# Patient Record
Sex: FEMALE | Race: WHITE | URBAN - METROPOLITAN AREA
[De-identification: names, ages, dates, MRNs, and addresses within clinical notes are randomized per-mention and may not be internally consistent; named-entity substitution may affect disease eponyms.]

---

## 2020-08-12 ENCOUNTER — OFFICE VISIT (OUTPATIENT)
Dept: URBAN - METROPOLITAN AREA CLINIC 33 | Facility: CLINIC | Age: 40
End: 2020-08-12
Payer: COMMERCIAL

## 2020-08-12 DIAGNOSIS — G43.B1 OPHTHALMOPLEGIC MIGRAINE, INTRACTABLE: Primary | ICD-10-CM

## 2020-08-12 PROCEDURE — 99203 OFFICE O/P NEW LOW 30 MIN: CPT | Performed by: OPTOMETRIST

## 2020-08-12 ASSESSMENT — KERATOMETRY
OD: 42.13
OS: 42.00

## 2020-08-12 ASSESSMENT — INTRAOCULAR PRESSURE
OD: 11
OS: 13

## 2020-08-12 NOTE — IMPRESSION/PLAN
Impression: Ophthalmoplegic migraine, intractable: G43. B1. Plan: Patient reports increased incidence of episodic blurred vision followed by debilitating headaches and nausea. Patient had CT scan done 2 years ago for same symptoms but results were normal. Recommend referral to PCP and/or neurology to address migraine headaches. Ocular health unremarkable.